# Patient Record
Sex: MALE | Race: WHITE | Employment: OTHER | URBAN - METROPOLITAN AREA
[De-identification: names, ages, dates, MRNs, and addresses within clinical notes are randomized per-mention and may not be internally consistent; named-entity substitution may affect disease eponyms.]

---

## 2019-07-30 ENCOUNTER — HOSPITAL ENCOUNTER (OUTPATIENT)
Dept: PREADMISSION TESTING | Age: 84
Discharge: HOME | End: 2019-07-30
Attending: UROLOGY
Payer: MEDICARE

## 2019-07-30 DIAGNOSIS — I10 ESSENTIAL HYPERTENSION, MALIGNANT: ICD-10-CM

## 2019-07-30 DIAGNOSIS — Z01.812 BLOOD TESTS PRIOR TO TREATMENT OR PROCEDURE: ICD-10-CM

## 2019-07-30 DIAGNOSIS — N39.41 URGE INCONTINENCE: ICD-10-CM

## 2019-07-30 LAB
ANION GAP SERPL CALC-SCNC: 5 MMOL/L (ref 3–18)
ATRIAL RATE: 60 BPM
BASOPHILS # BLD: 0 K/UL (ref 0–0.1)
BASOPHILS NFR BLD: 0 % (ref 0–2)
BUN SERPL-MCNC: 25 MG/DL (ref 7–18)
BUN/CREAT SERPL: 19 (ref 12–20)
CALCIUM SERPL-MCNC: 9 MG/DL (ref 8.5–10.1)
CALCULATED P AXIS, ECG09: 76 DEGREES
CALCULATED R AXIS, ECG10: 20 DEGREES
CALCULATED T AXIS, ECG11: -13 DEGREES
CHLORIDE SERPL-SCNC: 106 MMOL/L (ref 100–111)
CO2 SERPL-SCNC: 32 MMOL/L (ref 21–32)
CREAT SERPL-MCNC: 1.29 MG/DL (ref 0.6–1.3)
DIAGNOSIS, 93000: NORMAL
DIFFERENTIAL METHOD BLD: ABNORMAL
EOSINOPHIL # BLD: 0.1 K/UL (ref 0–0.4)
EOSINOPHIL NFR BLD: 2 % (ref 0–5)
ERYTHROCYTE [DISTWIDTH] IN BLOOD BY AUTOMATED COUNT: 14 % (ref 11.6–14.5)
GLUCOSE SERPL-MCNC: 101 MG/DL (ref 74–99)
HCT VFR BLD AUTO: 43.2 % (ref 36–48)
HGB BLD-MCNC: 13.9 G/DL (ref 13–16)
LYMPHOCYTES # BLD: 1.3 K/UL (ref 0.9–3.6)
LYMPHOCYTES NFR BLD: 18 % (ref 21–52)
MCH RBC QN AUTO: 29.4 PG (ref 24–34)
MCHC RBC AUTO-ENTMCNC: 32.2 G/DL (ref 31–37)
MCV RBC AUTO: 91.3 FL (ref 74–97)
MONOCYTES # BLD: 0.8 K/UL (ref 0.05–1.2)
MONOCYTES NFR BLD: 11 % (ref 3–10)
NEUTS SEG # BLD: 4.8 K/UL (ref 1.8–8)
NEUTS SEG NFR BLD: 69 % (ref 40–73)
P-R INTERVAL, ECG05: 226 MS
PLATELET # BLD AUTO: 120 K/UL (ref 135–420)
PMV BLD AUTO: 11.5 FL (ref 9.2–11.8)
POTASSIUM SERPL-SCNC: 4.3 MMOL/L (ref 3.5–5.5)
Q-T INTERVAL, ECG07: 424 MS
QRS DURATION, ECG06: 78 MS
QTC CALCULATION (BEZET), ECG08: 424 MS
RBC # BLD AUTO: 4.73 M/UL (ref 4.7–5.5)
SODIUM SERPL-SCNC: 143 MMOL/L (ref 136–145)
VENTRICULAR RATE, ECG03: 60 BPM
WBC # BLD AUTO: 7 K/UL (ref 4.6–13.2)

## 2019-07-30 PROCEDURE — 85025 COMPLETE CBC W/AUTO DIFF WBC: CPT

## 2019-07-30 PROCEDURE — 93005 ELECTROCARDIOGRAM TRACING: CPT

## 2019-07-30 PROCEDURE — 36415 COLL VENOUS BLD VENIPUNCTURE: CPT

## 2019-07-30 PROCEDURE — 80048 BASIC METABOLIC PNL TOTAL CA: CPT

## 2019-08-11 PROBLEM — N39.41 URGE INCONTINENCE: Status: ACTIVE | Noted: 2019-08-11

## 2019-08-12 ENCOUNTER — ANESTHESIA EVENT (OUTPATIENT)
Dept: SURGERY | Age: 84
End: 2019-08-12
Payer: MEDICARE

## 2019-08-12 RX ORDER — SODIUM CHLORIDE 0.9 % (FLUSH) 0.9 %
5-40 SYRINGE (ML) INJECTION EVERY 8 HOURS
Status: CANCELLED | OUTPATIENT
Start: 2019-08-12

## 2019-08-12 RX ORDER — SODIUM CHLORIDE, SODIUM LACTATE, POTASSIUM CHLORIDE, CALCIUM CHLORIDE 600; 310; 30; 20 MG/100ML; MG/100ML; MG/100ML; MG/100ML
125 INJECTION, SOLUTION INTRAVENOUS CONTINUOUS
Status: CANCELLED | OUTPATIENT
Start: 2019-08-12

## 2019-08-12 RX ORDER — HYDROCODONE BITARTRATE AND ACETAMINOPHEN 5; 325 MG/1; MG/1
1 TABLET ORAL AS NEEDED
Status: CANCELLED | OUTPATIENT
Start: 2019-08-12

## 2019-08-12 RX ORDER — SODIUM CHLORIDE 0.9 % (FLUSH) 0.9 %
5-40 SYRINGE (ML) INJECTION AS NEEDED
Status: CANCELLED | OUTPATIENT
Start: 2019-08-12

## 2019-08-12 NOTE — H&P
Urology [facility] [address] [facility]  [address]  Tel: [phone]  Fax: [phone]        Patient: Carie Hylton  YOB: 1929        Completed Orders (this encounter)  Order Interpretation Result Next Lab Date   URINALYSIS, AUTO, W/O SCOPE see detail Test 1Color: yellow. Clarity: clear. Bilirubin: negative. Ketones: negative. Specific Gravity: 1.010. Blood: small. pH: 5.5. Protein: negative. Urobilinogen: 0.2 mg/dl  Nitrite: negative. Leukocytes: negative. Assessment/Plan  # Detail Type Description    1. Assessment Enlarged prostate with lower urinary tract symptoms (N40.1). Patient Plan Continue flomax. 2. Assessment Urge incontinence (N39.41). Patient Plan We isaiah hav ehis interstim interrogated by [employer] rep. Likely it is no longer functional.  He will start myretriq 50mg. Plan Orders The patient had the following test(s) completed today: URINALYSIS, AUTO, W/O SCOPE. 3. Assessment Feeling of incomplete bladder emptying (R39.14). Patient Plan PVR next visit. 4. Other Orders Orders not associated to today's assessments. Plan Orders The patient had the following test(s) completed today: Abdominal/KUB 1 View. This 80year old male presents for Nocturia. History of Present Illness:  1. Nocturia   Onset was 17 years ago. Severity level is moderate-severe. It occurs daily. The problem is worse. Pertinent history includes history of diabetes. Associated symptoms include nocturia (7 times per night), pelvic pressure, slow stream, urgency and dribbling. Pertinent negatives include chills, constipation and fever. His InterStim is set on program 2 @ 2.9.    9/2013 - he has worsening nocturia and hasn''t really changed his interstim. Myrbetriq - had no effect. wakes up 7x/night. No daytime frequency. No problems while driving. He was changed to program 1 at 5.0. He declined DDAVP.     4/2014 = he is down to 3-4 times per night.  4/2015 = He continues to be very happy with how he is voiding. Nocturia is 2-3 times. No incontinence and minimal post-void dribbling. 6/1/16 -- He is doing okay. No incontinence. Nocturia x 2-3 still and rarely more. He has daytime urgency. No hematuria. 7/8/19 -- He has nocturia  4-8 times per night and has frequency up to 4 times in 1 hour or others every 2-3 hours. He asked for an alpha blocker today, but he has been on flomax. His stream is fine. Good  flow - -just to much urgency and frequency. He states that he is unaware that he ever had an interstim placed. He deosn't feel it. He has no idea that he ever had it in, let alone that there was a remote control. He is miserable and wishes it was better. PAST MEDICAL/SURGICAL HISTORY   (Detailed)    Disease/disorder Onset Date Management Date Comments   Incontinence 03/11/2011 PNE Interstim 03/11/2011    Dysphagia  EGD w/dilation 12/14/2016    NPH  right  shunt 07/16/2014    Urinary incontinence  1st and 2nd stage InterStim with programming 03/22/2011    Benign prostatic hyperplasia  Green light PVP 08/10/2010    Microscopic hematuria  cystoscopy 08/02/2010    Diabetes             PROBLEM LIST:   Problem List reviewed.    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     Gastric reflux 11/22/2016 N     Benign prostatic hypertrophy w/ urinary obstruction 03/11/2011 Y     Type II diabetes mellitus w/o complication 68/85/4714 Y     Hyperlipidemia 07/12/2010 Y     Microscopic hematuria 07/12/2010 Y     Allergic rhinitis 08/16/2011 Y     Benign essential hypertension 06/13/2011 Y     Urge incontinence of urine 03/11/2011 Y           Medications (active prior to today)  Medication Instructions Start Date Stop Date Refilled Elsewhere   Vitamin D3 1,000 unit capsule  03/10/2016   N   amlodipine 5 mg tablet TAKE 1 TABLET EVERY DAY 03/25/2016 03/25/2016 N   Allegra Allergy 180 mg tablet take 1 tablet by oral route  every day 03/21/2017   N   magnesium 200 mg tablet once daily for muscle cramp 03/21/2017 03/21/2017 N   fluticasone 50 mcg/actuation nasal spray,suspension spray 1 spray by intranasal route  every day in each nostril 09/26/2017 09/26/2017 N   Lipitor Oral Tablet 20 MG TAKE 1 TABLET BY MOUTH ONCE DAILY. 01/09/2019 01/09/2019 N   Aricept Oral Tablet 10 MG TAKE 1 TABLET BY MOUTH EVERY EVENING. 01/09/2019 01/09/2019 N   Protonix Oral Tablet Delayed Release 40 MG TAKE 1 TABLET BY MOUTH ONCE DAILY BEFORE BREAKFAST 01/09/2019 01/09/2019 N   Namenda XR Oral Capsule Extended Release 24 Hour 28 MG TAKE 1 CAPSULE BY MOUTH ONCE DAILY. 01/09/2019 01/09/2019 N   ranitidine 150 mg capsule take 1 capsule by oral route  every day after evening meal 04/09/2019   N   Flomax 0.4 mg capsule take 1 capsule by oral route  every day 1/2 hour following the evening meal 04/09/2019   N     Medication Reconciliation  Medications reconciled today.   Medication Reviewed  Adherence Medication Name Sig Desc Elsewhere Status   taking as directed Vitamin D3 1,000 unit capsule  N Verified   taking as directed amlodipine 5 mg tablet TAKE 1 TABLET EVERY DAY N Verified   taking as directed Allegra Allergy 180 mg tablet take 1 tablet by oral route  every day N Verified   taking as directed magnesium 200 mg tablet once daily for muscle cramp N Verified   taking as directed fluticasone 50 mcg/actuation nasal spray,suspension spray 1 spray by intranasal route  every day in each nostril N Verified   taking as directed Lipitor Oral Tablet 20 MG TAKE 1 TABLET BY MOUTH ONCE DAILY. N Verified   taking as directed Aricept Oral Tablet 10 MG TAKE 1 TABLET BY MOUTH EVERY EVENING. N Verified   taking as directed Protonix Oral Tablet Delayed Release 40 MG TAKE 1 TABLET BY MOUTH ONCE DAILY BEFORE BREAKFAST N Verified   taking as directed Namenda XR Oral Capsule Extended Release 24 Hour 28 MG TAKE 1 CAPSULE BY MOUTH ONCE DAILY. N Verified   taking as directed ranitidine 150 mg capsule take 1 capsule by oral route  every day after evening meal N Verified   taking as directed Flomax 0.4 mg capsule take 1 capsule by oral route  every day 1/2 hour following the evening meal N Verified     Allergies  Ingredient Reaction (Severity) Medication Name Comment   NO KNOWN ALLERGIES        Reviewed, no changes. Family History  (Detailed)  Relationship Family Member Name  Age at Death Condition Onset Age Cause of Death   Father  Y 80      Mother  Y 80            Social History:  (Detailed)  Tobacco use reviewed. Preferred language is [place]. Born in [facility]. EDUCATION/EMPLOYMENT/OCCUPATION  Employment History Status Retired Restrictions   [employer]  retired       MARITAL STATUS/FAMILY/SOCIAL SUPPORT  Currently single. CHILDREN  Does not have children. Tobacco use status: Current non-smoker. Smoking status: Never smoker. SMOKING STATUS  Use Status Type Smoking Status Usage Per Day Years Used Total Pack Years   no/never  Never smoker        TOBACCO/VAPING EXPOSURE  No passive smoke exposure. ALCOHOL  There is no history of alcohol use. CAFFEINE  The patient does not use caffeine. Islam/SPIRITUAL  Patient agrees to transfusion.  EXPERIENCE  [facility] and currently is discharged. Served in the [employer] for 2 years with an honorable discharge. Review of Systems  System Neg/Pos Details   Constitutional Negative Chills and Fever. ENMT Negative Ear infections and Sore throat. Eyes Negative Blurred vision, Double vision and Eye pain. Respiratory Negative Asthma, Chronic cough, Dyspnea and Wheezing. Cardio Negative Chest pain. GI Negative Constipation, Decreased appetite, Diarrhea, Nausea and Vomiting.  Positive Nocturia, Pelvic pressure, Slow stream, Urgency, Urinary dribbling. Endocrine Negative Cold intolerance, Heat intolerance, Increased thirst and Weight loss. Neuro Negative Headache and Tremors. Psych Negative Anxiety and Depression. Integumentary Negative Itching skin and Rash. MS Negative Back pain and Joint pain. Hema/Lymph Negative Easy bleeding. Reproductive Negative Sexual dysfunction. Vital Signs     Height  Time ft in cm Last Measured Height Position   9:39 AM 5.0 5.60 166.62 05/13/2019 0     Weight/BSA/BMI  Time lb oz kg Context BMI kg/m2 BSA m2   9:39 .00  63.503  22.87      Blood Pressure  Time BP mm/Hg Position Side Site Method Cuff Size   9:39 /80          Temperature/Pulse/Respiration  Time Temp F Temp C Temp Site Pulse/min Pattern Resp/ min   9:39 AM 97.90 36.61  70       Measured By  Time Measured by   9:39 AM Brie Mendez       Physical Exam  Exam Findings Details   Constitutional Normal Well developed. Neck Exam Normal Inspection - Normal.   Respiratory Normal Inspection - Normal.   Abdomen Normal No abdominal tenderness. Genitourinary Normal Penis - Normal. Urethral meatus - Normal. Scrotum - Normal. Epididymides - Normal. Lymph nodes - Normal. Testes - Normal. No CVA tenderness. No suprapubic tenderness. Extremity Normal No edema. Psychiatric * Inappropriate mood and affect. Psychiatric Normal Orientation - Oriented to time, place, person & situation. Patient Education  # Patient Education   1.  Frequent Urination: Care Instructions     Medications (added, continued, or stopped today)  Start Date Medication Directions PRN Status PRN Reason Instruction Stop Date   03/21/2017 Allegra Allergy 180 mg tablet take 1 tablet by oral route  every day N      03/25/2016 amlodipine 5 mg tablet TAKE 1 TABLET EVERY DAY N      01/09/2019 Aricept Oral Tablet 10 MG TAKE 1 TABLET BY MOUTH EVERY EVENING. N      04/09/2019 Flomax 0.4 mg capsule take 1 capsule by oral route  every day 1/2 hour following the evening meal N      09/26/2017 fluticasone 50 mcg/actuation nasal spray,suspension spray 1 spray by intranasal route  every day in each nostril N      01/09/2019 Lipitor Oral Tablet 20 MG TAKE 1 TABLET BY MOUTH ONCE DAILY. N      03/21/2017 magnesium 200 mg tablet once daily for muscle cramp N      01/09/2019 Namenda XR Oral Capsule Extended Release 24 Hour 28 MG TAKE 1 CAPSULE BY MOUTH ONCE DAILY.  N      01/09/2019 Protonix Oral Tablet Delayed Release 40 MG TAKE 1 TABLET BY MOUTH ONCE DAILY BEFORE BREAKFAST N      04/09/2019 ranitidine 150 mg capsule take 1 capsule by oral route  every day after evening meal N      03/10/2016 Vitamin D3 1,000 unit capsule  N      Active Patient Care Team Members    Name Contact Agency Type Support Role Relationship Active Date Inactive Date Specialty   Waylan Seip   Patient provider PCP   Family Practice   Waylan Seip   encounter provider       Carli Jennings   encounter provider    Urology   Jose Gresham   Emergency Contact Other Adult      Romayne Gray   encounter provider           Provider:       Abisai Palmer MD

## 2019-08-13 ENCOUNTER — APPOINTMENT (OUTPATIENT)
Dept: GENERAL RADIOLOGY | Age: 84
End: 2019-08-13
Attending: UROLOGY
Payer: MEDICARE

## 2019-08-13 ENCOUNTER — ANESTHESIA (OUTPATIENT)
Dept: SURGERY | Age: 84
End: 2019-08-13
Payer: MEDICARE

## 2019-08-13 ENCOUNTER — HOSPITAL ENCOUNTER (OUTPATIENT)
Age: 84
Setting detail: OUTPATIENT SURGERY
Discharge: HOME | End: 2019-08-13
Attending: UROLOGY | Admitting: UROLOGY
Payer: MEDICARE

## 2019-08-13 VITALS
TEMPERATURE: 98.1 F | HEART RATE: 70 BPM | OXYGEN SATURATION: 99 % | SYSTOLIC BLOOD PRESSURE: 163 MMHG | WEIGHT: 141.19 LBS | DIASTOLIC BLOOD PRESSURE: 84 MMHG | HEIGHT: 68 IN | BODY MASS INDEX: 21.4 KG/M2 | RESPIRATION RATE: 16 BRPM

## 2019-08-13 PROCEDURE — 77030040361 HC SLV COMPR DVT MDII -B: Performed by: UROLOGY

## 2019-08-13 PROCEDURE — 77030020788: Performed by: UROLOGY

## 2019-08-13 PROCEDURE — C1767 GENERATOR, NEURO NON-RECHARG: HCPCS | Performed by: UROLOGY

## 2019-08-13 PROCEDURE — C1787 PATIENT PROGR, NEUROSTIM: HCPCS | Performed by: UROLOGY

## 2019-08-13 PROCEDURE — 77030020782 HC GWN BAIR PAWS FLX 3M -B: Performed by: UROLOGY

## 2019-08-13 PROCEDURE — 74011250636 HC RX REV CODE- 250/636

## 2019-08-13 PROCEDURE — 74011000250 HC RX REV CODE- 250

## 2019-08-13 PROCEDURE — 74011000272 HC RX REV CODE- 272: Performed by: UROLOGY

## 2019-08-13 PROCEDURE — 76010000138 HC OR TIME 0.5 TO 1 HR: Performed by: UROLOGY

## 2019-08-13 PROCEDURE — 76210000021 HC REC RM PH II 0.5 TO 1 HR: Performed by: UROLOGY

## 2019-08-13 PROCEDURE — 76060000032 HC ANESTHESIA 0.5 TO 1 HR: Performed by: UROLOGY

## 2019-08-13 PROCEDURE — 74011000250 HC RX REV CODE- 250: Performed by: UROLOGY

## 2019-08-13 PROCEDURE — 77030010507 HC ADH SKN DERMBND J&J -B: Performed by: UROLOGY

## 2019-08-13 PROCEDURE — 77030018836 HC SOL IRR NACL ICUM -A: Performed by: UROLOGY

## 2019-08-13 PROCEDURE — 77030031139 HC SUT VCRL2 J&J -A: Performed by: UROLOGY

## 2019-08-13 PROCEDURE — 74011250636 HC RX REV CODE- 250/636: Performed by: UROLOGY

## 2019-08-13 DEVICE — GENERATOR INTERSTIM X --: Type: IMPLANTABLE DEVICE | Site: BACK | Status: FUNCTIONAL

## 2019-08-13 RX ORDER — SODIUM CHLORIDE, SODIUM LACTATE, POTASSIUM CHLORIDE, CALCIUM CHLORIDE 600; 310; 30; 20 MG/100ML; MG/100ML; MG/100ML; MG/100ML
125 INJECTION, SOLUTION INTRAVENOUS CONTINUOUS
Status: DISCONTINUED | OUTPATIENT
Start: 2019-08-13 | End: 2019-08-13 | Stop reason: HOSPADM

## 2019-08-13 RX ORDER — PROPOFOL 10 MG/ML
INJECTION, EMULSION INTRAVENOUS AS NEEDED
Status: DISCONTINUED | OUTPATIENT
Start: 2019-08-13 | End: 2019-08-13 | Stop reason: HOSPADM

## 2019-08-13 RX ORDER — CEPHALEXIN 500 MG/1
500 CAPSULE ORAL 2 TIMES DAILY
Qty: 6 CAP | Refills: 0 | Status: SHIPPED | OUTPATIENT
Start: 2019-08-13 | End: 2019-08-16

## 2019-08-13 RX ORDER — CEFAZOLIN SODIUM/WATER 2 G/20 ML
2 SYRINGE (ML) INTRAVENOUS ONCE
Status: COMPLETED | OUTPATIENT
Start: 2019-08-13 | End: 2019-08-13

## 2019-08-13 RX ORDER — LIDOCAINE HYDROCHLORIDE 20 MG/ML
INJECTION, SOLUTION EPIDURAL; INFILTRATION; INTRACAUDAL; PERINEURAL AS NEEDED
Status: DISCONTINUED | OUTPATIENT
Start: 2019-08-13 | End: 2019-08-13 | Stop reason: HOSPADM

## 2019-08-13 RX ORDER — KETAMINE HYDROCHLORIDE 10 MG/ML
INJECTION, SOLUTION INTRAMUSCULAR; INTRAVENOUS AS NEEDED
Status: DISCONTINUED | OUTPATIENT
Start: 2019-08-13 | End: 2019-08-13 | Stop reason: HOSPADM

## 2019-08-13 RX ADMIN — LIDOCAINE HYDROCHLORIDE 15 MG: 20 INJECTION, SOLUTION EPIDURAL; INFILTRATION; INTRACAUDAL; PERINEURAL at 07:49

## 2019-08-13 RX ADMIN — PROPOFOL 30 MG: 10 INJECTION, EMULSION INTRAVENOUS at 07:15

## 2019-08-13 RX ADMIN — PROPOFOL 30 MG: 10 INJECTION, EMULSION INTRAVENOUS at 07:42

## 2019-08-13 RX ADMIN — SODIUM CHLORIDE, SODIUM LACTATE, POTASSIUM CHLORIDE, AND CALCIUM CHLORIDE 125 ML/HR: 600; 310; 30; 20 INJECTION, SOLUTION INTRAVENOUS at 05:28

## 2019-08-13 RX ADMIN — KETAMINE HYDROCHLORIDE 10 MG: 10 INJECTION, SOLUTION INTRAMUSCULAR; INTRAVENOUS at 07:10

## 2019-08-13 RX ADMIN — KETAMINE HYDROCHLORIDE 10 MG: 10 INJECTION, SOLUTION INTRAMUSCULAR; INTRAVENOUS at 07:19

## 2019-08-13 RX ADMIN — LIDOCAINE HYDROCHLORIDE 15 MG: 20 INJECTION, SOLUTION EPIDURAL; INFILTRATION; INTRACAUDAL; PERINEURAL at 07:07

## 2019-08-13 RX ADMIN — LIDOCAINE HYDROCHLORIDE 15 MG: 20 INJECTION, SOLUTION EPIDURAL; INFILTRATION; INTRACAUDAL; PERINEURAL at 07:34

## 2019-08-13 RX ADMIN — Medication 2 G: at 07:08

## 2019-08-13 RX ADMIN — KETAMINE HYDROCHLORIDE 10 MG: 10 INJECTION, SOLUTION INTRAMUSCULAR; INTRAVENOUS at 07:28

## 2019-08-13 RX ADMIN — PROPOFOL 30 MG: 10 INJECTION, EMULSION INTRAVENOUS at 07:49

## 2019-08-13 RX ADMIN — PROPOFOL 30 MG: 10 INJECTION, EMULSION INTRAVENOUS at 07:07

## 2019-08-13 RX ADMIN — LIDOCAINE HYDROCHLORIDE 15 MG: 20 INJECTION, SOLUTION EPIDURAL; INFILTRATION; INTRACAUDAL; PERINEURAL at 07:42

## 2019-08-13 RX ADMIN — LIDOCAINE HYDROCHLORIDE 15 MG: 20 INJECTION, SOLUTION EPIDURAL; INFILTRATION; INTRACAUDAL; PERINEURAL at 07:15

## 2019-08-13 RX ADMIN — PROPOFOL 30 MG: 10 INJECTION, EMULSION INTRAVENOUS at 07:34

## 2019-08-13 NOTE — BRIEF OP NOTE
BRIEF OPERATIVE NOTE    Date of Procedure: 8/13/2019   Preoperative Diagnosis: URGE INCONTINENCE  Postoperative Diagnosis: URGE INCONTINENCE    Procedure(s):  INTERSTIM GENERATOR CHANGE WITH INTRAOPERATIVE PROGRAMMING  Surgeon(s) and Role:     * Mara Spaulding MD - Primary         Surgical Assistant: none    Surgical Staff:  Circ-1: Verta Ileana  Circ-2: Roberta Gilmore  Radiology Technician: Isaiah Waite  Scrub Tech-1: Francisco Whipple  Scrub Tech-2: Triston Shear  Event Time In Time Out   Incision Start 0720    Incision Close 0751      Anesthesia: MAC   Estimated Blood Loss: 5ml  Specimens: * No specimens in log *   Findings: the interstim generator was found embedded with the gluteus muscle. Impedence in lead 2 were poor. Complications: none  Implants:   Implant Name Type Inv.  Item Serial No.  Lot No. LRB No. Used Action   GENERATOR INTERSTIM II IPG --  - [id number]  GENERATOR INTERSTIM II IPG --  [id number]  Left 1 Implanted

## 2019-08-13 NOTE — DISCHARGE INSTRUCTIONS
DISCHARGE SUMMARY from Nurse    PATIENT INSTRUCTIONS:    After general anesthesia or intravenous sedation, for 24 hours or while taking prescription Narcotics:  · Limit your activities  · Do not drive and operate hazardous machinery  · Do not make important personal or business decisions  · Do  not drink alcoholic beverages  · If you have not urinated within 8 hours after discharge, please contact your surgeon on call. Report the following to your surgeon:  · Excessive pain, swelling, redness or odor of or around the surgical area  · Temperature over 100.5  · Nausea and vomiting lasting longer than 4 hours or if unable to take medications  · Any signs of decreased circulation or nerve impairment to extremity: change in color, persistent  numbness, tingling, coldness or increase pain  · Any questions    What to do at Home:  REGULAR DIET  DR Quintero [address] UP    If you experience any of the following symptoms heavy bleeding, unable to void, fevers, severe pain, please follow up with dr Kimberly Farias    *  Please give a list of your current medications to your Primary Care Provider. *  Please update this list whenever your medications are discontinued, doses are      changed, or new medications (including over-the-counter products) are added. *  Please carry medication information at all times in case of emergency situations. These are general instructions for a healthy lifestyle:    No smoking/ No tobacco products/ Avoid exposure to second hand smoke  Surgeon General's Warning:  Quitting smoking now greatly reduces serious risk to your health.     Obesity, smoking, and sedentary lifestyle greatly increases your risk for illness    A healthy diet, regular physical exercise & weight monitoring are important for maintaining a healthy lifestyle    You may be retaining fluid if you have a history of heart failure or if you experience any of the following symptoms:  Weight gain of 3 pounds or more overnight or 5 pounds in a week, increased swelling in our hands or feet or shortness of breath while lying flat in bed. Please call your doctor as soon as you notice any of these symptoms; do not wait until your next office visit. The discharge information has been reviewed with the patient and caregiver. The patient and caregiver verbalized understanding. Discharge medications reviewed with the patient and caregiver and appropriate educational materials and side effects teaching were provided. ___________________________________________________________________________________________________________________________________Remove dressing on Thursday. You may shower on Thursday    No baths or swimming    Do not scrub incision.     No exercise or lifting  for 3 weeks    Take Tylenol for pain    Patient armband removed and shredded

## 2019-08-13 NOTE — ANESTHESIA POSTPROCEDURE EVALUATION
Post-Anesthesia Evaluation and Assessment    Cardiovascular Function/Vital Signs  Visit Vitals  /84 (BP 1 Location: Left arm, BP Patient Position: At rest)   Pulse 70   Temp 36.7 °C (98.1 °F)   Resp 16   Ht 5' 8\" (1.727 m)   Wt 64 kg (141 lb 3 oz)   SpO2 99%   BMI 21.47 kg/m²       Patient is status post Procedure(s):  INTERSTIM BATTERY CHANGE WITH C-ARM. Nausea/Vomiting: Controlled. Postoperative hydration reviewed and adequate. Pain:  Pain Scale 1: Numeric (0 - 10) (08/13/19 0814)  Pain Intensity 1: 0 (08/13/19 1019)   Managed. Neurological Status:   Neuro (WDL): Within Defined Limits (08/13/19 0814)   At baseline. Mental Status and Level of Consciousness: Baseline and stable. Pulmonary Status:   O2 Device: Room air (08/13/19 4389)   Adequate oxygenation and airway patent. Complications related to anesthesia: None    Post-anesthesia assessment completed. No concerns. Patient has met all discharge requirements.     Signed By: Ana Luisa Brush MD

## 2019-08-13 NOTE — ANESTHESIA PREPROCEDURE EVALUATION
Relevant Problems   No relevant active problems       Anesthetic History   No history of anesthetic complications            Review of Systems / Medical History  Patient summary reviewed, nursing notes reviewed and pertinent labs reviewed    Pulmonary  Within defined limits                 Neuro/Psych   Within defined limits           Cardiovascular  Within defined limits                Exercise tolerance: >4 METS     GI/Hepatic/Renal  Within defined limits              Endo/Other  Within defined limits           Other Findings              Physical Exam    Airway  Mallampati: III  TM Distance: < 4 cm  Neck ROM: decreased range of motion   Mouth opening: Diminished (comment)     Cardiovascular  Regular rate and rhythm,  S1 and S2 normal,  no murmur, click, rub, or gallop  Rhythm: regular  Rate: normal         Dental  No notable dental hx       Pulmonary  Breath sounds clear to auscultation               Abdominal  GI exam deferred       Other Findings            Anesthetic Plan    ASA: 2  Anesthesia type: MAC            Anesthetic plan and risks discussed with: Patient

## 2019-08-13 NOTE — OP NOTES
[facility]  OPER[facility]IVE REPORT    Name:  Raquel Coon  MR#:   [id number]  :  05/10/1929  ACCOUNT #:  [de-identified]  DATE OF SERVICE:  2019    PREOPERATIVE DIAGNOSIS:  Urge incontinence. POSTOPERATIVE DIAGNOSIS:  Urge incontinence. PROCEDURE PERFORMED:  InterStim battery/generator change, second stage with intraoperative programming. SURGEON:  Minerva Parker MD    ASSISTANT:  None. ANESTHESIA:  MAC. SPECIMENS:  None. COMPLICATIONS:  None. IMPLANTS:  InterStim II battery. ESTIMATED BLOOD LOSS:  5 mL. FINDINGS:  The patient's InterStim generator was noted to be embedded within the gluteus neville muscle. The generator was changed without problem. Intraoperative programming was done. Impedances were such that lead 2 was poorly functional; however, we were able to program around that without difficulty. DISPOSITION:  The patient was taken to recovery in a stable condition. HISTORY OF PRESENT ILLNESS:  The patient is a 49-year-old gentleman with a history of urge incontinence, who has had worsening bladder dysfunction. His InterStim has been off and it is unclear for exactly how long, but the battery was completely dead on interrogation. He presents for battery change. PROCEDURE:  Preoperatively, risks and benefits of surgery were described to the patient. The risks include but are not limited to bleeding, infection, injury to the muscle, back, or chronic pain. The patient understood the risks and signed the informed consent. The patient was taken to the operating room, placed on the OR table in supine position. He was administered MAC anesthetic. He was then placed in prone position. He was shaved and prepped and draped in the usual sterile manner. After time-out was done, incision was made horizontally in  the left upper outer buttock over the area of his prior scar.   Fluoroscopy was used to help locate the InterStim because it had rotated 90 degrees and it was difficult to palpate. As I began to use a hemostat to spread the tissue going down on top of the InterStim, it became apparent that I was eventually spreading through muscle. Using Bovie electrocautery, some of the muscle fibers were divided. I did this as little as possible. I then eventually found a capsule and sharply incised the capsule and delivered the InterStim through the incision. The InterStim hex nut was loosened, and the lead was removed from the generator. This was passed off. The lead was cleaned with saline, and the pocket was irrigated with saline. The lead was dried off extensively and carefully using a lap pad. The lead was then inserted into a new InterStim generator. The hex nut was tightened and the generator was placed back into the capsule. Impedances were checked and in lead 2, impedances were poor. Intraoperative programming was done to work around that using the leads as contact point 0, 1, and 3. The area was irrigated again and dried. The muscle layer and Mariluz's was closed with interrupted 3-0 Vicryl suture. The skin was then closed with a running 4-0 subcuticular stitch. Dermabond was applied. A dry sterile dressing was applied. At this point, the patient taken to recovery in stable condition.       Miko Roldan, MD [email or web address]  D:  08/13/2019 8:11  T:  08/13/2019 9:10  JOB #:  [id number]

## 2019-08-13 NOTE — INTERVAL H&P NOTE
H&P Update: 
Chikis Paci was seen and examined. History and physical has been reviewed. Significant clinical changes have occurred as noted:  He had his battery examined and it was totally nonfunctional.  We will do a interstim battery change. Risks of bleeding, infection pain were discussed

## (undated) DEVICE — REM POLYHESIVE ADULT PATIENT RETURN ELECTRODE: Brand: VALLEYLAB

## (undated) DEVICE — (D)PREP SKN CHLRAPRP APPL 26ML -- CONVERT TO ITEM 371833

## (undated) DEVICE — SHEET,DRAPE,40X58,STERILE: Brand: MEDLINE

## (undated) DEVICE — 1010 S-DRAPE TOWEL DRAPE 10/BX: Brand: STERI-DRAPE™

## (undated) DEVICE — GOWN,AURORA,NONRNF,XL,30/CS: Brand: MEDLINE

## (undated) DEVICE — 3M™ TEGADERM™ TRANSPARENT FILM DRESSING FRAME STYLE, 1627, 4 IN X 10 IN (10 CM X 25 CM), 20/CT 4CT/CASE: Brand: 3M™ TEGADERM™

## (undated) DEVICE — 3M™ TEGADERM™ TRANSPARENT FILM DRESSING FRAME STYLE, 1626W, 4 IN X 4-3/4 IN (10 CM X 12 CM), 50/CT 4CT/CASE: Brand: 3M™ TEGADERM™

## (undated) DEVICE — DERMABOND SKIN ADH 0.7ML -- DERMABOND ADVANCED 12/BX

## (undated) DEVICE — SPONGE GZ W4XL4IN COT 12 PLY TYP VII WVN C FLD DSGN

## (undated) DEVICE — STRIP,CLOSURE,WOUND,MEDI-STRIP,1/2X4: Brand: MEDLINE

## (undated) DEVICE — STRAP,POSITIONING,KNEE/BODY,FOAM,4X60": Brand: MEDLINE

## (undated) DEVICE — SKIN MARKER,REGULAR TIP WITH RULER AND LABELS: Brand: DEVON

## (undated) DEVICE — DRAPE XR C ARM 41X74IN LF --

## (undated) DEVICE — ROCKER SWITCH PENCIL HOLSTER: Brand: VALLEYLAB

## (undated) DEVICE — SUTURE COAT VCRL SZ 4-0 L18IN ABSRB UD L19MM PS-2 1/2 CIR J496G

## (undated) DEVICE — INTENDED FOR TISSUE SEPARATION, AND OTHER PROCEDURES THAT REQUIRE A SHARP SURGICAL BLADE TO PUNCTURE OR CUT.: Brand: BARD-PARKER ® CARBON RIB-BACK BLADES

## (undated) DEVICE — DBD-PACK,LAPAROTOMY,2 REINFORCED GOWNS: Brand: MEDLINE

## (undated) DEVICE — SOL IRRIGATION INJ NACL 0.9% 500ML BTL

## (undated) DEVICE — SHEET,DRAPE,70X85,STERILE: Brand: MEDLINE

## (undated) DEVICE — 6619 2 PTNT ISO SYS INCISE AREA&LT;(&GT;&&LT;)&GT;P: Brand: STERI-DRAPE™ IOBAN™ 2

## (undated) DEVICE — GARMENT,MEDLINE,DVT,INT,CALF,MED, GEN2: Brand: MEDLINE

## (undated) DEVICE — KIT HDSET COMM SMRT PRGMR GU PROX US INTERSTIM

## (undated) DEVICE — STERILE POLYISOPRENE POWDER-FREE SURGICAL GLOVES: Brand: PROTEXIS

## (undated) DEVICE — BSHR MAJOR BASIN PACK-LF: Brand: MEDLINE INDUSTRIES, INC.

## (undated) DEVICE — 3-0 COATED VICRYL PLUS UNDYED 1X27" SH --

## (undated) DEVICE — SYR 10ML CTRL LR LCK NSAF LF --

## (undated) DEVICE — MASTISOL ADHESIVE LIQ 2/3ML